# Patient Record
Sex: MALE | Race: WHITE | NOT HISPANIC OR LATINO | Employment: FULL TIME | URBAN - METROPOLITAN AREA
[De-identification: names, ages, dates, MRNs, and addresses within clinical notes are randomized per-mention and may not be internally consistent; named-entity substitution may affect disease eponyms.]

---

## 2019-03-01 ENCOUNTER — OFFICE VISIT (OUTPATIENT)
Dept: URGENT CARE | Age: 44
End: 2019-03-01
Payer: COMMERCIAL

## 2019-03-01 VITALS
RESPIRATION RATE: 16 BRPM | BODY MASS INDEX: 28.71 KG/M2 | WEIGHT: 212 LBS | TEMPERATURE: 98 F | SYSTOLIC BLOOD PRESSURE: 139 MMHG | DIASTOLIC BLOOD PRESSURE: 86 MMHG | HEIGHT: 72 IN | HEART RATE: 100 BPM | OXYGEN SATURATION: 97 %

## 2019-03-01 DIAGNOSIS — J02.9 SORE THROAT: ICD-10-CM

## 2019-03-01 DIAGNOSIS — J01.00 ACUTE MAXILLARY SINUSITIS, RECURRENCE NOT SPECIFIED: Primary | ICD-10-CM

## 2019-03-01 LAB — S PYO AG THROAT QL: NEGATIVE

## 2019-03-01 PROCEDURE — 87070 CULTURE OTHR SPECIMN AEROBIC: CPT | Performed by: PHYSICIAN ASSISTANT

## 2019-03-01 PROCEDURE — 99213 OFFICE O/P EST LOW 20 MIN: CPT | Performed by: PHYSICIAN ASSISTANT

## 2019-03-01 PROCEDURE — 87430 STREP A AG IA: CPT | Performed by: PHYSICIAN ASSISTANT

## 2019-03-01 RX ORDER — AMOXICILLIN AND CLAVULANATE POTASSIUM 875; 125 MG/1; MG/1
1 TABLET, FILM COATED ORAL EVERY 12 HOURS SCHEDULED
Qty: 14 TABLET | Refills: 0 | Status: SHIPPED | OUTPATIENT
Start: 2019-03-01 | End: 2019-03-08

## 2019-03-01 NOTE — PATIENT INSTRUCTIONS
Take antibiotics as prescribed  Use Flonase or nasal saline spray for symptomatic treatment  Stay hydrated with lots of water/fluids  Follow-up with PCP in the next few days for reexamination and to ensure resolution of symptoms  Go to the ED if any fevers, unable to stay hydrated, abdominal pain, chest pain, shortness of breath, new or worsening symptoms or other concerning symptoms

## 2019-03-01 NOTE — PROGRESS NOTES
[facility]        NAME: Matt Zaldivar is a 37 y o  male  : 1975    MRN: [id number]  DATE: 2019  TIME: 5:03 PM    Assessment and Plan   Acute maxillary sinusitis, recurrence not specified [J01 00]  1  Acute maxillary sinusitis, recurrence not specified  amoxicillin-clavulanate (AUGMENTIN) 875-125 mg per tablet   2  Sore throat  POCT rapid strepA    Throat culture     Rapid strep negative  Will treat acute sinusitis    Patient Instructions     Take antibiotics as prescribed  Use Flonase or nasal saline spray for symptomatic treatment  Stay hydrated with lots of water/fluids  Follow-up with PCP in the next few days for reexamination and to ensure resolution of symptoms  Go to the ED if any fevers, unable to stay hydrated, abdominal pain, chest pain, shortness of breath, new or worsening symptoms or other concerning symptoms  Chief Complaint     Chief Complaint   Patient presents with    Sore Throat     Pt c/o sore throat since Monday  Denies fevers  History of Present Illness       24-year-old male presents with sore throat x6 days  States past few days also starting to get nasal congestion, sinus pressure, postnasal drip  Has tried DayQuil/NyQuil and Mucinex with some relief  States his daughter was sick at home but hers resolved a few days ago  He denies any fevers, chest pain, chest tightness, shortness of breath, GI/ symptoms or other complaints  Review of Systems   Review of Systems   Constitutional: Negative for appetite change, chills, fatigue and fever  HENT: Positive for congestion, postnasal drip, rhinorrhea, sinus pressure and sore throat  Negative for ear pain, facial swelling, trouble swallowing and voice change  Eyes: Negative for photophobia, itching and visual disturbance  Respiratory: Negative for cough, shortness of breath and wheezing  Cardiovascular: Negative for chest pain and leg swelling     Gastrointestinal: Negative for abdominal pain, diarrhea, nausea and vomiting  Musculoskeletal: Negative for back pain, joint swelling, neck pain and neck stiffness  Skin: Negative for rash  Neurological: Negative for dizziness, seizures, weakness, numbness and headaches  All other systems reviewed and are negative  Current Medications       Current Outpatient Medications:     amoxicillin-clavulanate (AUGMENTIN) 875-125 mg per tablet, Take 1 tablet by mouth every 12 (twelve) hours for 7 days, Disp: 14 tablet, Rfl: 0    Current Allergies     Allergies as of 03/01/2019    (No Known Allergies)            The following portions of the patient's history were reviewed and updated as appropriate: allergies, current medications, past family history, past medical history, past social history, past surgical history and problem list      History reviewed  No pertinent past medical history  History reviewed  No pertinent surgical history  History reviewed  No pertinent family history  Medications have been verified  Objective   /86   Pulse 100   Temp 98 °F (36 7 °C) (Tympanic)   Resp 16   Ht 6' (1 829 m)   Wt 96 2 kg (212 lb)   SpO2 97%   BMI 28 75 kg/m²        Physical Exam     Physical Exam   Constitutional: He is oriented to person, place, and time  He appears well-developed and well-nourished  No distress  Smiling, nontoxic appearing   HENT:   Head: Normocephalic and atraumatic  Mouth/Throat: Uvula is midline and mucous membranes are normal  No uvula swelling  No posterior oropharyngeal edema or tonsillar abscesses  Clear nasal dc  TMs intact, pearly grey, fluid visualized behind TM b/l  Mild PND present with mildly erythematous posterior pharynx  Mild bilateral maxillary sinus pressure/discomfort to palpation  Airway patent, handling secretions  Eyes: Pupils are equal, round, and reactive to light  Conjunctivae and EOM are normal    Neck: Normal range of motion  Neck supple     Cardiovascular: Normal rate, regular rhythm and normal heart sounds  Pulmonary/Chest: Effort normal and breath sounds normal  No respiratory distress  He has no wheezes  He exhibits no tenderness  Neurological: He is alert and oriented to person, place, and time  Skin: Skin is warm and dry  No rash noted  Psychiatric: He has a normal mood and affect  His behavior is normal    Nursing note and vitals reviewed

## 2019-03-04 LAB — BACTERIA THROAT CULT: NORMAL

## 2019-05-13 ENCOUNTER — OFFICE VISIT (OUTPATIENT)
Dept: URGENT CARE | Age: 44
End: 2019-05-13
Payer: COMMERCIAL

## 2019-05-13 VITALS
DIASTOLIC BLOOD PRESSURE: 92 MMHG | HEART RATE: 90 BPM | RESPIRATION RATE: 18 BRPM | SYSTOLIC BLOOD PRESSURE: 158 MMHG | WEIGHT: 215 LBS | BODY MASS INDEX: 30.1 KG/M2 | OXYGEN SATURATION: 98 % | HEIGHT: 71 IN | TEMPERATURE: 96.8 F

## 2019-05-13 DIAGNOSIS — H69.81 DYSFUNCTION OF RIGHT EUSTACHIAN TUBE: Primary | ICD-10-CM

## 2019-05-13 PROCEDURE — 99213 OFFICE O/P EST LOW 20 MIN: CPT | Performed by: FAMILY MEDICINE

## 2021-10-16 ENCOUNTER — OFFICE VISIT (OUTPATIENT)
Dept: URGENT CARE | Age: 46
End: 2021-10-16
Payer: COMMERCIAL

## 2021-10-16 VITALS
RESPIRATION RATE: 18 BRPM | WEIGHT: 205 LBS | BODY MASS INDEX: 26.31 KG/M2 | HEIGHT: 74 IN | HEART RATE: 121 BPM | TEMPERATURE: 100.1 F | OXYGEN SATURATION: 97 %

## 2021-10-16 DIAGNOSIS — R05.9 COUGH: Primary | ICD-10-CM

## 2021-10-16 PROCEDURE — 99213 OFFICE O/P EST LOW 20 MIN: CPT | Performed by: NURSE PRACTITIONER
